# Patient Record
Sex: MALE | Race: WHITE | Employment: FULL TIME | URBAN - METROPOLITAN AREA
[De-identification: names, ages, dates, MRNs, and addresses within clinical notes are randomized per-mention and may not be internally consistent; named-entity substitution may affect disease eponyms.]

---

## 2018-09-13 ENCOUNTER — OFFICE VISIT (OUTPATIENT)
Dept: URGENT CARE | Age: 53
End: 2018-09-13

## 2018-09-13 VITALS
RESPIRATION RATE: 16 BRPM | DIASTOLIC BLOOD PRESSURE: 74 MMHG | WEIGHT: 143 LBS | TEMPERATURE: 97.7 F | HEART RATE: 78 BPM | BODY MASS INDEX: 22.44 KG/M2 | HEIGHT: 67 IN | SYSTOLIC BLOOD PRESSURE: 106 MMHG | OXYGEN SATURATION: 99 %

## 2018-09-13 DIAGNOSIS — E86.0 DEHYDRATION: ICD-10-CM

## 2018-09-13 DIAGNOSIS — R06.02 SOB (SHORTNESS OF BREATH): ICD-10-CM

## 2018-09-13 DIAGNOSIS — R42 DIZZINESS: Primary | ICD-10-CM

## 2018-09-13 DIAGNOSIS — I95.9 HYPOTENSION, UNSPECIFIED HYPOTENSION TYPE: ICD-10-CM

## 2018-09-13 LAB
ANION GAP BLD CALC-SCNC: 18 MMOL/L
BILIRUB UR QL STRIP: NEGATIVE
BUN BLD-MCNC: 15 MG/DL
CHLORIDE BLD-SCNC: 103 MMOL/L
CO2 BLD-SCNC: 24 MMOL/L
CREAT BLD-MCNC: 1.3 MG/DL (ref 0.6–1.3)
GLUCOSE BLD STRIP.AUTO-MCNC: 102 MG/DL
GLUCOSE UR-MCNC: NEGATIVE MG/DL
HCT VFR BLD CALC: 51 %
HGB BLD-MCNC: NORMAL G/DL
IONIZED CALCIUM ISTA,ICAI: 1.17
KETONES P FAST UR STRIP-MCNC: NEGATIVE MG/DL
PH UR STRIP: 6 [PH] (ref 4.6–8)
POTASSIUM BLD-SCNC: 4 MMOL/L
PROT UR QL STRIP: NORMAL
SODIUM BLD-SCNC: 140 MMOL/L
SP GR UR STRIP: 1.02 (ref 1–1.03)
UA UROBILINOGEN AMB POC: NORMAL (ref 0.2–1)
URINALYSIS CLARITY POC: NORMAL
URINALYSIS COLOR POC: NORMAL
URINE BLOOD POC: NEGATIVE
URINE LEUKOCYTES POC: NEGATIVE
URINE NITRITES POC: NEGATIVE

## 2018-09-13 NOTE — MR AVS SNAPSHOT
[address] [place] [address] 
[phone] Patient: Zain Cortez MRN: [id number] :1965 Visit Information Date & Time Provider Department Dept. Phone Encounter #  
 2018 11:30 AM [address] [facility] [phone] [id number] Upcoming Health Maintenance Date Due Hepatitis C Screening 1965 DTaP/Tdap/Td series (1 - Tdap) 1986 FOBT Q 1 YEAR AGE 50-75 2015 Influenza Age 5 to Adult 2018 Allergies as of 2018  Review Complete On: 2018 By: Candi Dakins, MD  
 No Known Allergies Current Immunizations  Never Reviewed No immunizations on file. Not reviewed this visit You Were Diagnosed With   
  
 Codes Comments Dizziness    -  Primary ICD-10-CM: D75 ICD-9-CM: 780.4 SOB (shortness of breath)     ICD-10-CM: R06.02 
ICD-9-CM: 786.05 Hypotension, unspecified hypotension type     ICD-10-CM: I95.9 ICD-9-CM: 458.9 Dehydration     ICD-10-CM: E86.0 ICD-9-CM: 276.51 Vitals BP Pulse Temp Resp Height(growth percentile) Weight(growth percentile) 111/64 92 97.7 °F (36.5 °C) 16 5' 7\" (1.702 m) 143 lb (64.9 kg) SpO2 BMI Smoking Status 99% 22.4 kg/m2 Current Every Day Smoker Vitals History BMI and BSA Data Body Mass Index Body Surface Area  
 22.4 kg/m 2 1.75 m 2 Preferred Pharmacy Pharmacy Name Phone [id number] [address] [phone] Your Updated Medication List  
  
Notice  As of 2018  1:19 PM  
 You have not been prescribed any medications. We Performed the Following AMB POC ISTAT CHEM 8+ [AWW355 Custom] AMB POC URINALYSIS DIP STICK AUTO W/O MICRO [78062 CPT(R)] EKG NOTEWRITER(ASAP ONLY) [NNM2662 Custom] Comments: This order was created via procedure documentation To-Do List   
 09/13/2018 ECG:  EKG, 12 LEAD, INITIAL   
  
 09/13/2018 Imaging:  XR CHEST PA LAT Patient Instructions Dehydration: Care Instructions Your Care Instructions Dehydration happens when your body loses too much fluid. This might happen when you do not drink enough water or you lose large amounts of fluids from your body because of diarrhea, vomiting, or sweating. Severe dehydration can be life-threatening. Water and minerals called electrolytes help put your body fluids back in balance. Learn the early signs of fluid loss, and drink more fluids to prevent dehydration. Follow-up care is a key part of your treatment and safety. Be sure to make and go to all appointments, and call your doctor if you are having problems. It's also a good idea to know your test results and keep a list of the medicines you take. How can you care for yourself at home? · To prevent dehydration, drink plenty of fluids, enough so that your urine is light yellow or clear like water. Choose water and other caffeine-free clear liquids until you feel better. If you have kidney, heart, or liver disease and have to limit fluids, talk with your doctor before you increase the amount of fluids you drink. · If you do not feel like eating or drinking, try taking small sips of water, sports drinks, or other rehydration drinks. · Get plenty of rest. 
To prevent dehydration · Add more fluids to your diet and daily routine, unless your doctor has told you not to. · During hot weather, drink more fluids. Drink even more fluids if you exercise a lot. Stay away from drinks with alcohol or caffeine. · Watch for the symptoms of dehydration. These include: ¨ A dry, sticky mouth. ¨ Dark yellow urine, and not much of it. ¨ Dry and sunken eyes. ¨ Feeling very tired. · Learn what problems can lead to dehydration. These include: ¨ Diarrhea, fever, and vomiting. ¨ Any illness with a fever, such as pneumonia or the flu. ¨ Activities that cause heavy sweating, such as endurance races and heavy outdoor work in hot or humid weather. ¨ Alcohol or drug abuse or withdrawal. 
¨ Certain medicines, such as cold and allergy pills (antihistamines), diet pills (diuretics), and laxatives. ¨ Certain diseases, such as diabetes, cancer, and heart or kidney disease. When should you call for help? Call 911 anytime you think you may need emergency care. For example, call if: 
  · You passed out (lost consciousness).  
 Call your doctor now or seek immediate medical care if: 
  · You are confused and cannot think clearly.  
  · You are dizzy or lightheaded, or you feel like you may faint.  
  · You have signs of needing more fluids. You have sunken eyes and a dry mouth, and you pass only a little dark urine.  
  · You cannot keep fluids down.  
 Watch closely for changes in your health, and be sure to contact your doctor if: 
  · You are not making tears.  
  · Your skin is very dry and sags slowly back into place after you pinch it.  
  · Your mouth and eyes are very dry. Where can you learn more? Go to http://katerine-phyllis.info/. Enter R981 in the search box to learn more about \"Dehydration: Care Instructions. \" Current as of: November 20, 2017 Content Version: 11.7 © 5399-2824 [employer]. Care instructions adapted under license by [employer] (which disclaims liability or warranty for this information). If you have questions about a medical condition or this instruction, always ask your healthcare professional. [place] [address] any warranty or liability for your use of this information. Introducing [facility]! Seng Ballesteros introduces [facility] patient portal. Now you can access parts of your medical record, email your doctor's office, and request medication refills online. 1. In your internet browser, go to [email or web address] 2. Click on the First Time User? Click Here link in the Sign In box. You will see the New Member Sign Up page. 3. Enter your Hachimenroppi Access Code exactly as it appears below. You will not need to use this code after youve completed the sign-up process. If you do not sign up before the expiration date, you must request a new code. · Hachimenroppi Access Code: [email or web address] Expires: 12/12/2018 11:39 AM 
 
4. Enter the last four digits of your Social Security Number (xxxx) and Date of Birth (mm/dd/yyyy) as indicated and click Submit. You will be taken to the next sign-up page. 5. Create a Hachimenroppi ID. This will be your Hachimenroppi login ID and cannot be changed, so think of one that is secure and easy to remember. 6. Create a Hachimenroppi password. You can change your password at any time. 7. Enter your Password Reset Question and Answer. This can be used at a later time if you forget your password. 8. Enter your e-mail address. You will receive e-mail notification when new information is available in [address] 9. Click Sign Up. You can now view and download portions of your medical record. 10. Click the Download Summary menu link to download a portable copy of your medical information. If you have questions, please visit the Frequently Asked Questions section of the Hachimenroppi website. Remember, Hachimenroppi is NOT to be used for urgent needs. For medical emergencies, dial 911. Now available from your iPhone and Android! Please provide this summary of care documentation to your next provider. Your primary care clinician is listed as Tanya Berger. If you have any questions after today's visit, please call [phone].

## 2018-09-13 NOTE — PATIENT INSTRUCTIONS
Dehydration: Care Instructions Your Care Instructions Dehydration happens when your body loses too much fluid. This might happen when you do not drink enough water or you lose large amounts of fluids from your body because of diarrhea, vomiting, or sweating. Severe dehydration can be life-threatening. Water and minerals called electrolytes help put your body fluids back in balance. Learn the early signs of fluid loss, and drink more fluids to prevent dehydration. Follow-up care is a key part of your treatment and safety. Be sure to make and go to all appointments, and call your doctor if you are having problems. It's also a good idea to know your test results and keep a list of the medicines you take. How can you care for yourself at home? · To prevent dehydration, drink plenty of fluids, enough so that your urine is light yellow or clear like water. Choose water and other caffeine-free clear liquids until you feel better. If you have kidney, heart, or liver disease and have to limit fluids, talk with your doctor before you increase the amount of fluids you drink. · If you do not feel like eating or drinking, try taking small sips of water, sports drinks, or other rehydration drinks. · Get plenty of rest. 
To prevent dehydration · Add more fluids to your diet and daily routine, unless your doctor has told you not to. · During hot weather, drink more fluids. Drink even more fluids if you exercise a lot. Stay away from drinks with alcohol or caffeine. · Watch for the symptoms of dehydration. These include: ¨ A dry, sticky mouth. ¨ Dark yellow urine, and not much of it. ¨ Dry and sunken eyes. ¨ Feeling very tired. · Learn what problems can lead to dehydration. These include: ¨ Diarrhea, fever, and vomiting. ¨ Any illness with a fever, such as pneumonia or the flu. ¨ Activities that cause heavy sweating, such as endurance races and heavy outdoor work in hot or humid weather. ¨ Alcohol or drug abuse or withdrawal. 
¨ Certain medicines, such as cold and allergy pills (antihistamines), diet pills (diuretics), and laxatives. ¨ Certain diseases, such as diabetes, cancer, and heart or kidney disease. When should you call for help? Call 911 anytime you think you may need emergency care. For example, call if: 
  · You passed out (lost consciousness).  
 Call your doctor now or seek immediate medical care if: 
  · You are confused and cannot think clearly.  
  · You are dizzy or lightheaded, or you feel like you may faint.  
  · You have signs of needing more fluids. You have sunken eyes and a dry mouth, and you pass only a little dark urine.  
  · You cannot keep fluids down.  
 Watch closely for changes in your health, and be sure to contact your doctor if: 
  · You are not making tears.  
  · Your skin is very dry and sags slowly back into place after you pinch it.  
  · Your mouth and eyes are very dry. Where can you learn more? Go to http://katerine-phyllis.info/. Enter P209 in the search box to learn more about \"Dehydration: Care Instructions. \" Current as of: November 20, 2017 Content Version: 11.7 © 7122-7624 [employer]wise[employer]orporated. Care instructions adapted under license by [employer] (which disclaims liability or warranty for this information). If you have questions about a medical condition or this instruction, always ask your healthcare professional. [place] [address] any warranty or liability for your use of this information.

## 2018-09-13 NOTE — PROGRESS NOTES
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 Felt that the episode this AM was worse than usual. Denies fever, abdominal pain, CP, palpitations. Has HLD and is a smoker. Reports that he drank a cup of coffee and OJ this AM. Drank 2 cups of water yesterday. Admits to not drinking enough fluids. The history is provided by the patient. History reviewed. No pertinent past medical history. History reviewed. No pertinent surgical history. History reviewed. No pertinent family history. Social History Social History  Marital status:  Spouse name: N/A  
 Number of children: N/A  
 Years of education: N/A Occupational History  Not on file. Social History Main Topics  Smoking status: Current Every Day Smoker  Smokeless tobacco: Never Used  Alcohol use Not on file  Drug use: Not on file  Sexual activity: Not on file Other Topics Concern  Not on file Social History Narrative  No narrative on file ALLERGIES: Review of patient's allergies indicates no known allergies. Review of Systems Constitutional: Negative for chills and fever. Eyes: Positive for visual disturbance. Respiratory: Positive for shortness of breath. Negative for wheezing. Cardiovascular: Negative for chest pain and palpitations. Gastrointestinal: Negative for abdominal pain, constipation, nausea and vomiting. Genitourinary: Negative for dysuria, flank pain, frequency and urgency. Musculoskeletal: Negative for myalgias. Skin: Negative for rash. Neurological: Positive for dizziness. Negative for headaches. Hematological: Negative for adenopathy. Vitals:  
 09/13/18 1143 09/13/18 1208 09/13/18 1240 09/13/18 1324 BP: (!) 74/53 97/57 111/64 106/74 Pulse: 100 87 92 78 Resp: 16 Temp: 97.7 °F (36.5 °C) SpO2: 99% Weight: 143 lb (64.9 kg) Height: 5' 7\" (1.702 m) Physical Exam  
Constitutional: He is oriented to person, place, and time. He appears well-developed and well-nourished. No distress. Cardiovascular: Normal rate, regular rhythm and normal heart sounds. Pulmonary/Chest: Effort normal and breath sounds normal. No respiratory distress. He has no wheezes. He has no rales. Abdominal: Soft. Bowel sounds are normal. He exhibits no distension. There is no tenderness. There is no rebound and no guarding. Neurological: He is alert and oriented to person, place, and time. He has normal strength. No cranial nerve deficit or sensory deficit. He displays a negative Romberg sign. Coordination normal.  
Skin: He is not diaphoretic. Psychiatric: He has a normal mood and affect. His behavior is normal. Judgment and thought content normal.  
Nursing note and vitals reviewed. MDM 
  ICD-10-CM ICD-9-CM 1. Dizziness R42 780.4 AMB POC ISTAT CHEM 8+  
   EKG, 12 LEAD, INITIAL  
   EKG, 12 LEAD, INITIAL  
   AMB POC URINALYSIS DIP STICK AUTO W/O MICRO 2. SOB (shortness of breath) R06.02 786.05 XR CHEST PA LAT 3. Hypotension, unspecified hypotension type I95.9 458.9 4. Dehydration E86.0 276.51 Medications Ordered Today Medications  sodium chloride 0.9 % bolus infusion 1,000 mL  sodium chloride 0.9 % bolus infusion 1,000 mL Dehydration and heat likely contributed to dizziness/blurred vision/SOB Dizziness, blurred vision and SOB resolved after 2L IVF. Push fluids The patients condition was discussed with the patient and they understand. The patient is to follow up with PCP. If signs and symptoms become worse the pt is to go to the ER. The patient is to take medications as prescribed. Results for orders placed or performed in visit on 09/13/18 AMB POC ISTAT CHEM 8+ Result Value Ref Range Sodium,  MMOL/L Potassium, POC 4.0 MMOL/L Chloride,  MMOL/L Calcium, ionized (POC) 1.17   
 CO2, POC 24 MMOL/L Glucose,  MG/DL  
 BUN, POC 15 MG/DL Creatinine, POC 1.3 0.6 - 1.3 MG/DL Hematocrit, POC 51 % Hemoglobin, POC  G/DL Anion gap, POC 18 MMOL/L  
AMB POC URINALYSIS DIP STICK AUTO W/O MICRO Result Value Ref Range Color (UA POC) Clarity (UA POC) Glucose (UA POC) Negative Negative Bilirubin (UA POC) Negative Negative Ketones (UA POC) Negative Negative Specific gravity (UA POC) 1.020 1.001 - 1.035 Blood (UA POC) Negative Negative pH (UA POC) 6.0 4.6 - 8.0 Protein (UA POC) Trace Negative Urobilinogen (UA POC) 0.2 mg/dL 0.2 - 1 Nitrites (UA POC) Negative Negative Leukocyte esterase (UA POC) Negative Negative XR Results (most recent): 
 
Results from Appointment encounter on 09/13/18 XR CHEST PA LAT Narrative EXAM:  XR CHEST PA LAT INDICATION: SOB 
 
COMPARISON: None. TECHNIQUE: Frontal and lateral views of the chest 
 
FINDINGS: No lobar consolidation, pleural effusion, or pneumothorax. Normal 
cardiomediastinal silhouette. No acute or aggressive osseous lesion. Impression IMPRESSION: No evidence of an acute cardiopulmonary process. EKG Date/Time: 9/13/2018 12:53 PM 
Performed by: Huber Gomes Authorized by: Huber Gomes Rhythm: sinus rhythm Rate: normal 
BPM: 87 QRS axis: normal 
Conduction: conduction normal 
ST Segments: ST segments normal 
T Waves: T waves normal 
Clinical impression: normal ECG

## 2024-06-15 ENCOUNTER — OFFICE VISIT (OUTPATIENT)
Age: 59
End: 2024-06-15

## 2024-06-15 VITALS
TEMPERATURE: 98 F | RESPIRATION RATE: 18 BRPM | DIASTOLIC BLOOD PRESSURE: 80 MMHG | WEIGHT: 184 LBS | HEIGHT: 67 IN | OXYGEN SATURATION: 99 % | SYSTOLIC BLOOD PRESSURE: 136 MMHG | HEART RATE: 62 BPM | BODY MASS INDEX: 28.88 KG/M2

## 2024-06-15 DIAGNOSIS — J30.2 SEASONAL ALLERGIES: ICD-10-CM

## 2024-06-15 DIAGNOSIS — R05.2 SUBACUTE COUGH: Primary | ICD-10-CM

## 2024-06-15 RX ORDER — BENZONATATE 200 MG/1
200 CAPSULE ORAL 3 TIMES DAILY PRN
Qty: 21 CAPSULE | Refills: 0 | Status: SHIPPED | OUTPATIENT
Start: 2024-06-15 | End: 2024-06-22

## 2024-06-15 ASSESSMENT — ENCOUNTER SYMPTOMS
RHINORRHEA: 0
COUGH: 1
CHEST TIGHTNESS: 0
NAUSEA: 0
VOMITING: 0
SHORTNESS OF BREATH: 0
SORE THROAT: 0
ABDOMINAL PAIN: 0

## 2024-06-15 NOTE — PATIENT INSTRUCTIONS
XR CHEST PA/LAT [IMG36]  Status: Final result     PACS Images     Show images for XR CHEST PA/LAT  XR CHEST PA/LAT  Order: [id number]  Status: Final result       Visible to patient: No (not released)       Next appt: None       Dx: Cough, unspecified type    0 Result Notes  Details    Reading Physician Reading Date Result Priority   Gustavo Mitchell MD  [phone] 6/15/2024      Narrative & Impression  Technique: Frontal and lateral view chest     Comparison: None     Findings:     Pulmonary parenchyma clear without consolidation or peribronchial thickening.     No pleural effusion or pneumothorax.     Cardiomediastinal contour unremarkable.     Bones are normal for age.     IMPRESSION:     No acute cardiopulmonary findings.     Electronically signed by: Gustavo Mitchell M.D. on 06/15/2024 09:22:42 AM   /[place]           Exam Ended: 06/15/24 09:10 EDT

## 2024-06-15 NOTE — PROGRESS NOTES
SpO2: 99%   Weight: 83.5 kg (184 lb)   Height: 1.702 m (5' 7\")       Physical Exam  Vitals and nursing note reviewed.   Constitutional:       General: He is not in acute distress.     Appearance: Normal appearance. He is not ill-appearing or toxic-appearing.   HENT:      Head: Normocephalic and atraumatic.      Ears:      Comments: BL TM pearly gray, no erythema, no effusion, no bulging  Tonsils 2+BL, no erythema, no exudate, uvula midline.  Overall good dentition.  No visible nasal congestion.  No obvious palpable lymphadenopathy.  No sinus tenderness.  Eyes:      Extraocular Movements: Extraocular movements intact.      Conjunctiva/sclera: Conjunctivae normal.   Cardiovascular:      Rate and Rhythm: Normal rate.      Heart sounds: Normal heart sounds.   Pulmonary:      Effort: Pulmonary effort is normal. No respiratory distress.      Breath sounds: No wheezing, rhonchi or rales.      Comments: Diminished throughout  Musculoskeletal:      Cervical back: Normal range of motion.   Skin:     General: Skin is warm and dry.   Neurological:      General: No focal deficit present.      Mental Status: He is alert.   Psychiatric:         Mood and Affect: Mood normal.         Behavior: Behavior normal.            Assessment/ Plan:     Test Results:  Xray Result (most recent):  XR CHEST STANDARD TWO VW 06/15/2024    Narrative  Technique: Frontal and lateral view chest    Comparison: None    Findings:    Pulmonary parenchyma clear without consolidation or peribronchial thickening.    No pleural effusion or pneumothorax.    Cardiomediastinal contour unremarkable.    Bones are normal for age.    Impression  No acute cardiopulmonary findings.    Electronically signed by: Gustavo Mitchell M.D. on 06/15/2024 09:22:42 AM  /[place]         Diagnosis Orders   1. Subacute cough  XR CHEST PA/LAT    benzonatate (TESSALON) 200 MG capsule      2. Seasonal allergies          Impression:  CXR negative for acute process.  Rx benzonatate as